# Patient Record
(demographics unavailable — no encounter records)

---

## 2024-10-15 NOTE — DATA REVIEWED
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

## 2024-10-15 NOTE — ASSESSMENT
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 and radiographic findings.   Educated about AS, and discussed if left of untreated, it leads to disability given ankylosing of spine, and higher risk of fractures. Explained that given systemic dz, it places her at higher cardiovascular risk if left untreated. Can also lead to uveitis, inflammation of spine, and rare cases, lung dz.   Pt can benefit from TNF inhibitor to address skin, GI and axial arthritis.   Plan -Labs w serologies, inflammatory markers -Xrays SI joint, thoracic spine RTO depending on above results

## 2024-10-15 NOTE — CONSULT LETTER
[Dear  ___] : Dear  [unfilled], [Consult Letter:] : I had the pleasure of evaluating your patient, [unfilled]. [Consult Closing:] : Thank you very much for allowing me to participate in the care of this patient.  If you have any questions, please do not hesitate to contact me. [Sincerely,] : Sincerely, [FreeTextEntry2] : Dr. Monster Restrepo  [address] [place] [address] [FreeTextEntry3] : Jacques Encarnacion MD

## 2024-10-15 NOTE — HISTORY OF PRESENT ILLNESS
[FreeTextEntry1] : 60 F here for consultation  Pt had significant diarrhea, bloating for years. Pt also had abdominal pain.  Pt said she went to GI, and has improved.  Pt had endoscopy/colonoscopy  Pt was given medication by GI, which has helped pt. Pt also was given probiotics. Not sure if pt has IBD.   Pt reports mid back pain for 3 years. Pt says unsure if time of day affect mid back pain. Pt says activities do not affect intensity of pain. Pt says she has significant stiffness of spine. Pt feels she does not walk "straight" and feels she curves. Pt has felt from stairs.   No fevers, h/a, hair loss, oral ulcers, epistaxis, sinusitis,  swollen glands, dry mouth, dry eyes, CP, SOB, cough, vision changes, abdominal pain, GERD, n/v, blood in stool or urine, focal weakness, sensory loss,  Raynaud's, weight loss.  +pt has hx of pso 2022 behind R ear. Pt was given creams, and shampoo but has not been enough.  +dry mouth, dry skin    OB: no hx of miscarriages; no hx of preeclampsia   Pt does not know medication

## 2024-10-15 NOTE — PHYSICAL EXAM
[General Appearance - Well Nourished] : well nourished [General Appearance - Well Developed] : well developed [Sclera] : the sclera and conjunctiva were normal [Auscultation Breath Sounds / Voice Sounds] : lungs were clear to auscultation bilaterally [Heart Rate And Rhythm] : heart rate was normal and rhythm regular [Heart Sounds] : normal S1 and S2 [Murmurs] : no murmurs [Abdomen Soft] : soft [Abdomen Tenderness] : non-tender [] : no hepato-splenomegaly [Cervical Lymph Nodes Enlarged Posterior Bilaterally] : posterior cervical [Cervical Lymph Nodes Enlarged Anterior Bilaterally] : anterior cervical [Supraclavicular Lymph Nodes Enlarged Bilaterally] : supraclavicular [Musculoskeletal - Swelling] : no joint swelling seen [Oriented To Time, Place, And Person] : oriented to person, place, and time [FreeTextEntry1] : psoriatic rash w significant erythema, scaling, mod plaque in back of R ear